# Patient Record
Sex: MALE | Race: WHITE
[De-identification: names, ages, dates, MRNs, and addresses within clinical notes are randomized per-mention and may not be internally consistent; named-entity substitution may affect disease eponyms.]

---

## 2019-06-14 ENCOUNTER — HOSPITAL ENCOUNTER (EMERGENCY)
Dept: HOSPITAL 95 - ER | Age: 79
Discharge: ANOTHER HOSPITAL | End: 2019-06-14
Payer: MEDICARE

## 2019-06-14 VITALS — WEIGHT: 178 LBS | BODY MASS INDEX: 24.11 KG/M2 | HEIGHT: 72 IN

## 2019-06-14 DIAGNOSIS — M81.0: ICD-10-CM

## 2019-06-14 DIAGNOSIS — K92.2: Primary | ICD-10-CM

## 2019-06-14 DIAGNOSIS — Z79.899: ICD-10-CM

## 2019-06-14 DIAGNOSIS — E11.22: ICD-10-CM

## 2019-06-14 DIAGNOSIS — I25.10: ICD-10-CM

## 2019-06-14 DIAGNOSIS — N18.9: ICD-10-CM

## 2019-06-14 DIAGNOSIS — E03.9: ICD-10-CM

## 2019-06-14 DIAGNOSIS — D50.0: ICD-10-CM

## 2019-06-14 LAB
ALBUMIN SERPL BCP-MCNC: 3.2 G/DL (ref 3.4–5)
ALBUMIN/GLOB SERPL: 1 {RATIO} (ref 0.8–1.8)
ALT SERPL W P-5'-P-CCNC: 9 U/L (ref 12–78)
ANION GAP SERPL CALCULATED.4IONS-SCNC: 7 MMOL/L (ref 6–16)
AST SERPL W P-5'-P-CCNC: 7 U/L (ref 12–37)
BASOPHILS # BLD AUTO: 0.01 K/MM3 (ref 0–0.23)
BASOPHILS NFR BLD AUTO: 0 % (ref 0–2)
BILIRUB SERPL-MCNC: 0.5 MG/DL (ref 0.1–1)
BUN SERPL-MCNC: 27 MG/DL (ref 8–24)
CALCIUM SERPL-MCNC: 8.5 MG/DL (ref 8.5–10.1)
CHLORIDE SERPL-SCNC: 109 MMOL/L (ref 98–108)
CO2 SERPL-SCNC: 27 MMOL/L (ref 21–32)
CREAT SERPL-MCNC: 1.14 MG/DL (ref 0.6–1.2)
DEPRECATED RDW RBC AUTO: 44.1 FL (ref 35.1–46.3)
EOSINOPHIL # BLD AUTO: 0.1 K/MM3 (ref 0–0.68)
EOSINOPHIL NFR BLD AUTO: 3 % (ref 0–6)
ERYTHROCYTE [DISTWIDTH] IN BLOOD BY AUTOMATED COUNT: 15.4 % (ref 11.7–14.2)
GLOBULIN SER CALC-MCNC: 3.3 G/DL (ref 2.2–4)
GLUCOSE SERPL-MCNC: 115 MG/DL (ref 70–99)
HCT VFR BLD AUTO: 33.2 % (ref 37–53)
HGB BLD-MCNC: 9.7 G/DL (ref 13.5–17.5)
IMM GRANULOCYTES # BLD AUTO: 0.01 K/MM3 (ref 0–0.1)
IMM GRANULOCYTES NFR BLD AUTO: 0 % (ref 0–1)
LYMPHOCYTES # BLD AUTO: 0.54 K/MM3 (ref 0.84–5.2)
LYMPHOCYTES NFR BLD AUTO: 14 % (ref 21–46)
MCHC RBC AUTO-ENTMCNC: 29.2 G/DL (ref 31.5–36.5)
MCV RBC AUTO: 79 FL (ref 80–100)
MONOCYTES # BLD AUTO: 0.33 K/MM3 (ref 0.16–1.47)
MONOCYTES NFR BLD AUTO: 8 % (ref 4–13)
NEUTROPHILS # BLD AUTO: 3.01 K/MM3 (ref 1.96–9.15)
NEUTROPHILS NFR BLD AUTO: 75 % (ref 41–73)
NRBC # BLD AUTO: 0 K/MM3 (ref 0–0.02)
NRBC BLD AUTO-RTO: 0 /100 WBC (ref 0–0.2)
PLATELET # BLD AUTO: 168 K/MM3 (ref 150–400)
POTASSIUM SERPL-SCNC: 4 MMOL/L (ref 3.5–5.5)
PROT SERPL-MCNC: 6.5 G/DL (ref 6.4–8.2)
PROTHROMBIN TIME: 12.3 SEC (ref 9.7–11.5)
SODIUM SERPL-SCNC: 143 MMOL/L (ref 136–145)

## 2019-11-25 ENCOUNTER — HOSPITAL ENCOUNTER (INPATIENT)
Dept: HOSPITAL 95 - ER | Age: 79
LOS: 4 days | Discharge: HOME HEALTH | DRG: 299 | End: 2019-11-29
Attending: FAMILY MEDICINE | Admitting: FAMILY MEDICINE
Payer: OTHER GOVERNMENT

## 2019-11-25 VITALS — HEIGHT: 72.01 IN | WEIGHT: 180.01 LBS | BODY MASS INDEX: 24.38 KG/M2

## 2019-11-25 DIAGNOSIS — I26.99: ICD-10-CM

## 2019-11-25 DIAGNOSIS — E11.22: ICD-10-CM

## 2019-11-25 DIAGNOSIS — K21.9: ICD-10-CM

## 2019-11-25 DIAGNOSIS — E78.5: ICD-10-CM

## 2019-11-25 DIAGNOSIS — I25.10: ICD-10-CM

## 2019-11-25 DIAGNOSIS — N40.0: ICD-10-CM

## 2019-11-25 DIAGNOSIS — Z66: ICD-10-CM

## 2019-11-25 DIAGNOSIS — I82.411: Primary | ICD-10-CM

## 2019-11-25 DIAGNOSIS — D63.8: ICD-10-CM

## 2019-11-25 DIAGNOSIS — M81.0: ICD-10-CM

## 2019-11-25 DIAGNOSIS — N18.9: ICD-10-CM

## 2019-11-25 DIAGNOSIS — E03.9: ICD-10-CM

## 2019-11-25 DIAGNOSIS — F03.91: ICD-10-CM

## 2019-11-25 DIAGNOSIS — I12.9: ICD-10-CM

## 2019-11-25 DIAGNOSIS — L03.115: ICD-10-CM

## 2019-11-25 DIAGNOSIS — G89.4: ICD-10-CM

## 2019-11-25 DIAGNOSIS — D72.819: ICD-10-CM

## 2019-11-25 DIAGNOSIS — I82.511: ICD-10-CM

## 2019-11-25 LAB
ALBUMIN SERPL BCP-MCNC: 3.2 G/DL (ref 3.4–5)
ALBUMIN/GLOB SERPL: 0.9 {RATIO} (ref 0.8–1.8)
ALT SERPL W P-5'-P-CCNC: 16 U/L (ref 12–78)
ANION GAP SERPL CALCULATED.4IONS-SCNC: 6 MMOL/L (ref 6–16)
AST SERPL W P-5'-P-CCNC: 10 U/L (ref 12–37)
BASOPHILS # BLD AUTO: 0.02 K/MM3 (ref 0–0.23)
BASOPHILS NFR BLD AUTO: 0 % (ref 0–2)
BILIRUB SERPL-MCNC: 0.3 MG/DL (ref 0.1–1)
BUN SERPL-MCNC: 22 MG/DL (ref 8–24)
CALCIUM SERPL-MCNC: 8.3 MG/DL (ref 8.5–10.1)
CHLORIDE SERPL-SCNC: 110 MMOL/L (ref 98–108)
CO2 SERPL-SCNC: 27 MMOL/L (ref 21–32)
CREAT SERPL-MCNC: 1.16 MG/DL (ref 0.6–1.2)
DEPRECATED RDW RBC AUTO: 49.4 FL (ref 35.1–46.3)
EOSINOPHIL # BLD AUTO: 0.1 K/MM3 (ref 0–0.68)
EOSINOPHIL NFR BLD AUTO: 2 % (ref 0–6)
ERYTHROCYTE [DISTWIDTH] IN BLOOD BY AUTOMATED COUNT: 17.4 % (ref 11.7–14.2)
GLOBULIN SER CALC-MCNC: 3.7 G/DL (ref 2.2–4)
GLUCOSE SERPL-MCNC: 142 MG/DL (ref 70–99)
HCT VFR BLD AUTO: 33.2 % (ref 37–53)
HGB BLD-MCNC: 9.7 G/DL (ref 13.5–17.5)
IMM GRANULOCYTES # BLD AUTO: 0.03 K/MM3 (ref 0–0.1)
IMM GRANULOCYTES NFR BLD AUTO: 1 % (ref 0–1)
LYMPHOCYTES # BLD AUTO: 0.36 K/MM3 (ref 0.84–5.2)
LYMPHOCYTES NFR BLD AUTO: 8 % (ref 21–46)
MCHC RBC AUTO-ENTMCNC: 29.2 G/DL (ref 31.5–36.5)
MCV RBC AUTO: 77 FL (ref 80–100)
MONOCYTES # BLD AUTO: 0.36 K/MM3 (ref 0.16–1.47)
MONOCYTES NFR BLD AUTO: 8 % (ref 4–13)
NEUTROPHILS # BLD AUTO: 3.62 K/MM3 (ref 1.96–9.15)
NEUTROPHILS NFR BLD AUTO: 81 % (ref 41–73)
NRBC # BLD AUTO: 0 K/MM3 (ref 0–0.02)
NRBC BLD AUTO-RTO: 0 /100 WBC (ref 0–0.2)
PLATELET # BLD AUTO: 141 K/MM3 (ref 150–400)
POTASSIUM SERPL-SCNC: 3.8 MMOL/L (ref 3.5–5.5)
PROT SERPL-MCNC: 6.9 G/DL (ref 6.4–8.2)
PROTHROMBIN TIME: 11.7 SEC (ref 9.7–11.5)
SODIUM SERPL-SCNC: 143 MMOL/L (ref 136–145)

## 2019-11-25 PROCEDURE — G0378 HOSPITAL OBSERVATION PER HR: HCPCS

## 2019-11-25 PROCEDURE — A9270 NON-COVERED ITEM OR SERVICE: HCPCS

## 2019-11-25 NOTE — NUR
PATIENT IS ALERT WITH MINOR CONFUSION. HE KNOWS HIS NAME, WIFE WHY HE IS AT
THE HOSPITAL, [facility]. HE HAS NOT COMPLAINED OF
FOOT PAIN SINCE HE WAS ADMITTED. HE HAS HEPARIN AND NS RUNNING. HE ATE DINNER
TONIGHT. PATIENT CLAIMS HE DOESNT WALK. REPORT GIVEN TO JOSE NORMAN.

## 2019-11-26 LAB
ALBUMIN SERPL BCP-MCNC: 2.8 G/DL (ref 3.4–5)
ALBUMIN/GLOB SERPL: 0.8 {RATIO} (ref 0.8–1.8)
ALT SERPL W P-5'-P-CCNC: 8 U/L (ref 12–78)
ANION GAP SERPL CALCULATED.4IONS-SCNC: 6 MMOL/L (ref 6–16)
AST SERPL W P-5'-P-CCNC: 9 U/L (ref 12–37)
BASOPHILS # BLD AUTO: 0.01 K/MM3 (ref 0–0.23)
BASOPHILS NFR BLD AUTO: 0 % (ref 0–2)
BILIRUB SERPL-MCNC: 0.4 MG/DL (ref 0.1–1)
BUN SERPL-MCNC: 19 MG/DL (ref 8–24)
CALCIUM SERPL-MCNC: 7.9 MG/DL (ref 8.5–10.1)
CHLORIDE SERPL-SCNC: 108 MMOL/L (ref 98–108)
CO2 SERPL-SCNC: 28 MMOL/L (ref 21–32)
CREAT SERPL-MCNC: 1.1 MG/DL (ref 0.6–1.2)
DEPRECATED RDW RBC AUTO: 48.9 FL (ref 35.1–46.3)
EOSINOPHIL # BLD AUTO: 0.17 K/MM3 (ref 0–0.68)
EOSINOPHIL NFR BLD AUTO: 4 % (ref 0–6)
ERYTHROCYTE [DISTWIDTH] IN BLOOD BY AUTOMATED COUNT: 17.5 % (ref 11.7–14.2)
GLOBULIN SER CALC-MCNC: 3.4 G/DL (ref 2.2–4)
GLUCOSE SERPL-MCNC: 122 MG/DL (ref 70–99)
HCT VFR BLD AUTO: 30.7 % (ref 37–53)
HGB BLD-MCNC: 8.8 G/DL (ref 13.5–17.5)
IMM GRANULOCYTES # BLD AUTO: 0.02 K/MM3 (ref 0–0.1)
IMM GRANULOCYTES NFR BLD AUTO: 1 % (ref 0–1)
LYMPHOCYTES # BLD AUTO: 0.56 K/MM3 (ref 0.84–5.2)
LYMPHOCYTES NFR BLD AUTO: 13 % (ref 21–46)
MAGNESIUM SERPL-MCNC: 1.9 MG/DL (ref 1.6–2.4)
MCHC RBC AUTO-ENTMCNC: 28.7 G/DL (ref 31.5–36.5)
MCV RBC AUTO: 77 FL (ref 80–100)
MONOCYTES # BLD AUTO: 0.46 K/MM3 (ref 0.16–1.47)
MONOCYTES NFR BLD AUTO: 11 % (ref 4–13)
NEUTROPHILS # BLD AUTO: 3.05 K/MM3 (ref 1.96–9.15)
NEUTROPHILS NFR BLD AUTO: 71 % (ref 41–73)
NRBC # BLD AUTO: 0 K/MM3 (ref 0–0.02)
NRBC BLD AUTO-RTO: 0 /100 WBC (ref 0–0.2)
PLATELET # BLD AUTO: 116 K/MM3 (ref 150–400)
POTASSIUM SERPL-SCNC: 4 MMOL/L (ref 3.5–5.5)
PROT SERPL-MCNC: 6.2 G/DL (ref 6.4–8.2)
SODIUM SERPL-SCNC: 142 MMOL/L (ref 136–145)

## 2019-11-26 NOTE — NUR
Spiritual Care initial note:
 
Asked to visit Mr. Calix as it was reported he was tearful earlier today.
When I entered room, he appeared aggitated.  He could not work the in-room
phone.  He began to complain that he hadn't eaten "since yesterday" in prep
for proceedure today.  He was angry he'd been kept waiting.  I provided
emotional affirmation and calm assurance of care.  He calmed slightly.  He
asked me to help him call his wife.  I did and ended visit at that point.  I
will remain available.

## 2019-11-26 NOTE — NUR
11/26/19   0600   NPO FOR POSS. PROCEDURE TODAY. VITALS STABLE. SLEPT WELL
THIS SHIFT. POOR SHORT-TERM MEMORY. NEEDS FREQUENT REMINDERS OF INFO. GIVEN.
REPOSITIONED Q 2 HOURS WITH HELP.

## 2019-11-26 NOTE — NUR
SHIFT SUMMARY
 
PATIENT IN BED ALL SHIFT NPO AWAITING VISIT FROM DOCTOR. UNABLE TO PERFORM ANY
THROMBECTOMY TODAY. RLE PAIN REPORTED AS TOLERABLE BY PATIENT. IV ABX INFUSED
NO ASE INDICATED. HEPARIN INFUSING AND VERIFIED WITH TWO NURSES.
AWAITING DOCTOR PRASANNA
INSTRUCTIONS AT THIS TIME.

## 2019-11-27 LAB
ANION GAP SERPL CALCULATED.4IONS-SCNC: 6 MMOL/L (ref 6–16)
BASOPHILS # BLD AUTO: 0.02 K/MM3 (ref 0–0.23)
BASOPHILS NFR BLD AUTO: 1 % (ref 0–2)
BUN SERPL-MCNC: 15 MG/DL (ref 8–24)
CALCIUM SERPL-MCNC: 8.2 MG/DL (ref 8.5–10.1)
CHLORIDE SERPL-SCNC: 109 MMOL/L (ref 98–108)
CO2 SERPL-SCNC: 27 MMOL/L (ref 21–32)
CREAT SERPL-MCNC: 1.02 MG/DL (ref 0.6–1.2)
DEPRECATED RDW RBC AUTO: 45.6 FL (ref 35.1–46.3)
EOSINOPHIL # BLD AUTO: 0.17 K/MM3 (ref 0–0.68)
EOSINOPHIL NFR BLD AUTO: 6 % (ref 0–6)
ERYTHROCYTE [DISTWIDTH] IN BLOOD BY AUTOMATED COUNT: 17 % (ref 11.7–14.2)
GLUCOSE SERPL-MCNC: 126 MG/DL (ref 70–99)
HCT VFR BLD AUTO: 32.7 % (ref 37–53)
HGB BLD-MCNC: 9.6 G/DL (ref 13.5–17.5)
IMM GRANULOCYTES # BLD AUTO: 0 K/MM3 (ref 0–0.1)
IMM GRANULOCYTES NFR BLD AUTO: 0 % (ref 0–1)
LYMPHOCYTES # BLD AUTO: 0.3 K/MM3 (ref 0.84–5.2)
LYMPHOCYTES NFR BLD AUTO: 10 % (ref 21–46)
MCHC RBC AUTO-ENTMCNC: 29.4 G/DL (ref 31.5–36.5)
MCV RBC AUTO: 75 FL (ref 80–100)
MONOCYTES # BLD AUTO: 0.21 K/MM3 (ref 0.16–1.47)
MONOCYTES NFR BLD AUTO: 7 % (ref 4–13)
NEUTROPHILS # BLD AUTO: 2.38 K/MM3 (ref 1.96–9.15)
NEUTROPHILS NFR BLD AUTO: 77 % (ref 41–73)
NRBC # BLD AUTO: 0 K/MM3 (ref 0–0.02)
NRBC BLD AUTO-RTO: 0 /100 WBC (ref 0–0.2)
PLATELET # BLD AUTO: 143 K/MM3 (ref 150–400)
POTASSIUM SERPL-SCNC: 4.2 MMOL/L (ref 3.5–5.5)
SODIUM SERPL-SCNC: 142 MMOL/L (ref 136–145)

## 2019-11-27 NOTE — NUR
11/27/19  0600  HEPARIN DRIP RUNNING AND RATE UNCHANGED PER PHARMACY THIS
SHIFT. PT FORGETFUL AND KEEPS BENDING ARMS WITH IV. HE ALSO REMOVES WRAPS
APPLIED BY RN TO PREVENT IVS "ALARMING". TURNED Q 2 HOURS WITH 2 STAFF.

## 2019-11-27 NOTE — NUR
SHIFT SUMMARY
 
PATIENT IN BED ENTIRE SHIFT. MULTIPLE BOWEL MOVEMENTS THIS SHIFT, SOFT. DR. MIMS VISITED WITH PATIENT TODAY AND DISCUSSED OPTIONS, AWAITING DEFINITIVE
PLAN FOR PATIENT FROM DR. MIMS. PATIENT APPETITE IS GOOD. PAIN LEVEL
REPORTED AS TOLERABLE. ABLE TO PALPATE PEDAL PULSE OF RIGHT LEG TODAY AND
SWELLING STILL PRESENT. DRESSINGS ON BILAT LEGS CHANGED AND THEY ARE CDI.
FLUID RESTRICTION OBSERVED BY PATIENT. TELEMETRY D/C TODAY. PATIENT HAD A
CONSULT WITH PT TODAY AND THEY WORKED WITH PATIENT IN ROOM, TOLERATED WELL.

## 2019-11-27 NOTE — NUR
SHIFT SUMMARY
 
PATIENT IN BED ENTIRETY OF SHIFT BUT TURNED Q 2 HOURS. MULTIPLE JARED CARE FOR
PATIENT D/T INCONTINENCE. PATIENT REMAINED ON HEPARIN DRIP ALL SHIFT + FLUIDS.
PATIENT VISITED BY DR. MIMS TODAY ABOUT THE PLAN FOR THE CLOT IN LEG,
AWAITING A PLAN BY DR. MIMS. ALL PO MEDICATIONS ADMINISTERED AND TOLERATED
WELL.

## 2019-11-28 LAB
ANION GAP SERPL CALCULATED.4IONS-SCNC: 6 MMOL/L (ref 6–16)
BASOPHILS # BLD AUTO: 0.03 K/MM3 (ref 0–0.23)
BASOPHILS NFR BLD AUTO: 1 % (ref 0–2)
BUN SERPL-MCNC: 16 MG/DL (ref 8–24)
CALCIUM SERPL-MCNC: 8.5 MG/DL (ref 8.5–10.1)
CHLORIDE SERPL-SCNC: 109 MMOL/L (ref 98–108)
CO2 SERPL-SCNC: 27 MMOL/L (ref 21–32)
CREAT SERPL-MCNC: 1.04 MG/DL (ref 0.6–1.2)
DEPRECATED RDW RBC AUTO: 46.5 FL (ref 35.1–46.3)
EOSINOPHIL # BLD AUTO: 0.15 K/MM3 (ref 0–0.68)
EOSINOPHIL NFR BLD AUTO: 4 % (ref 0–6)
ERYTHROCYTE [DISTWIDTH] IN BLOOD BY AUTOMATED COUNT: 17.2 % (ref 11.7–14.2)
GLUCOSE SERPL-MCNC: 117 MG/DL (ref 70–99)
HCT VFR BLD AUTO: 32.6 % (ref 37–53)
HGB BLD-MCNC: 9.4 G/DL (ref 13.5–17.5)
IMM GRANULOCYTES # BLD AUTO: 0.02 K/MM3 (ref 0–0.1)
IMM GRANULOCYTES NFR BLD AUTO: 1 % (ref 0–1)
LYMPHOCYTES # BLD AUTO: 0.41 K/MM3 (ref 0.84–5.2)
LYMPHOCYTES NFR BLD AUTO: 12 % (ref 21–46)
MCHC RBC AUTO-ENTMCNC: 28.8 G/DL (ref 31.5–36.5)
MCV RBC AUTO: 76 FL (ref 80–100)
MONOCYTES # BLD AUTO: 0.26 K/MM3 (ref 0.16–1.47)
MONOCYTES NFR BLD AUTO: 8 % (ref 4–13)
NEUTROPHILS # BLD AUTO: 2.6 K/MM3 (ref 1.96–9.15)
NEUTROPHILS NFR BLD AUTO: 75 % (ref 41–73)
NRBC # BLD AUTO: 0 K/MM3 (ref 0–0.02)
NRBC BLD AUTO-RTO: 0 /100 WBC (ref 0–0.2)
PLATELET # BLD AUTO: 139 K/MM3 (ref 150–400)
POTASSIUM SERPL-SCNC: 4 MMOL/L (ref 3.5–5.5)
SODIUM SERPL-SCNC: 142 MMOL/L (ref 136–145)

## 2019-11-28 NOTE — NUR
SHIFT SUMMARY
 
NO ACUTE CHANGES THIS SHIFT. PT HAS BEEN SLEEPING A LOT OF THE SHIFT. HEPARIN
INFUSION STOPPED THIS AFTERNOON AND XARELTO WAS STARTED. PLANS FOR PT TO
DISCHARGE TOMORROW AND RETURN TO [facility]. NO COMPLAINTS AT THIS TIME.
CALL LIGHT IN REACH. WILL CONTINUE TO MONITOR AND REPORT TO ONCOMING RN.

## 2019-11-28 NOTE — NUR
SHIFT SUMMARY
PT PLEASANT WITH FLAT AFFECT. A/O. ANSWERS QUESTIONS APPROPRIATELY. PT
REPORTED PAIN TO RLE BUT DECLINED TYLENOL. MINIMAL REDNESS AND SWELLING TO
RLE. HEPARIN DRIP CONTINUES. NO RATE CHANGES THIS EVENING, CONTINUES TO RUN AT
16 U/KG/HR. PT INCONTINENT. ATTENDS IN PLACE. PT TURNED Q 2 HRS. PT HAD ONE
SMALL SOFT BOWEL MOVEMENT THIS EVENING. REDNESS TO JARED/RECTAL AREA, BARRIER
CREAM APPLIED. OTHERWISE PT HAD UNEVENTFUL NIGHT. SLEPT WELL WHEN NOT BEING
WOKEN BY STAFF. VITAL SIGNS STABLE.

## 2019-11-29 NOTE — NUR
SHIFT SUMMARY
PT PLEASANT AND COOPERATIVE. FLAT AFFECT. REMAINED INCONTINENT. REPOSITIONED
IN BED FREQUENTLY AND ATTENDS CHANGED AS NEEDED. ORDERED XARELTO DOSE GIVEN
THIS EVENING. MINIMAL SWELLING TO RLE. PT CONTINUES TO REPORT PAIN TO RLE.
OFFERED TYLENOL BUT PT DECLINED. PT SLEPT MOST OF THE NIGHT WHEN NOT BEING
WOKEN BY STAFF. VITAL SIGNS STABLE. NO ACUTE CHANGES THIS SHIFT. WILL CONTINUE
TO MONITOR.

## 2019-12-04 ENCOUNTER — HOSPITAL ENCOUNTER (EMERGENCY)
Dept: HOSPITAL 95 - ER | Age: 79
Discharge: HOME | End: 2019-12-04
Payer: OTHER GOVERNMENT

## 2019-12-04 VITALS — WEIGHT: 164.99 LBS | HEIGHT: 72 IN | BODY MASS INDEX: 22.35 KG/M2

## 2019-12-04 DIAGNOSIS — E11.22: ICD-10-CM

## 2019-12-04 DIAGNOSIS — I12.9: ICD-10-CM

## 2019-12-04 DIAGNOSIS — N18.9: ICD-10-CM

## 2019-12-04 DIAGNOSIS — E03.9: ICD-10-CM

## 2019-12-04 DIAGNOSIS — K21.9: ICD-10-CM

## 2019-12-04 DIAGNOSIS — R41.0: ICD-10-CM

## 2019-12-04 DIAGNOSIS — Z79.899: ICD-10-CM

## 2019-12-04 DIAGNOSIS — F03.90: Primary | ICD-10-CM

## 2019-12-04 LAB
ANION GAP SERPL CALCULATED.4IONS-SCNC: 7 MMOL/L (ref 6–16)
BASOPHILS # BLD AUTO: 0.01 K/MM3 (ref 0–0.23)
BASOPHILS NFR BLD AUTO: 0 % (ref 0–2)
BUN SERPL-MCNC: 30 MG/DL (ref 8–24)
CALCIUM SERPL-MCNC: 8.6 MG/DL (ref 8.5–10.1)
CHLORIDE SERPL-SCNC: 111 MMOL/L (ref 98–108)
CO2 SERPL-SCNC: 25 MMOL/L (ref 21–32)
CREAT SERPL-MCNC: 1.14 MG/DL (ref 0.6–1.2)
DEPRECATED RDW RBC AUTO: 47.8 FL (ref 35.1–46.3)
EOSINOPHIL # BLD AUTO: 0.01 K/MM3 (ref 0–0.68)
EOSINOPHIL NFR BLD AUTO: 0 % (ref 0–6)
ERYTHROCYTE [DISTWIDTH] IN BLOOD BY AUTOMATED COUNT: 18 % (ref 11.7–14.2)
GLUCOSE SERPL-MCNC: 117 MG/DL (ref 70–99)
HCT VFR BLD AUTO: 34.1 % (ref 37–53)
HGB BLD-MCNC: 10 G/DL (ref 13.5–17.5)
IMM GRANULOCYTES # BLD AUTO: 0.02 K/MM3 (ref 0–0.1)
IMM GRANULOCYTES NFR BLD AUTO: 0 % (ref 0–1)
LYMPHOCYTES # BLD AUTO: 0.25 K/MM3 (ref 0.84–5.2)
LYMPHOCYTES NFR BLD AUTO: 3 % (ref 21–46)
MCHC RBC AUTO-ENTMCNC: 29.3 G/DL (ref 31.5–36.5)
MCV RBC AUTO: 75 FL (ref 80–100)
MONOCYTES # BLD AUTO: 0.49 K/MM3 (ref 0.16–1.47)
MONOCYTES NFR BLD AUTO: 6 % (ref 4–13)
NEUTROPHILS # BLD AUTO: 7.46 K/MM3 (ref 1.96–9.15)
NEUTROPHILS NFR BLD AUTO: 91 % (ref 41–73)
NRBC # BLD AUTO: 0 K/MM3 (ref 0–0.02)
NRBC BLD AUTO-RTO: 0 /100 WBC (ref 0–0.2)
PLATELET # BLD AUTO: 146 K/MM3 (ref 150–400)
POTASSIUM SERPL-SCNC: 3.9 MMOL/L (ref 3.5–5.5)
PROT UR STRIP-MCNC: (no result) MG/DL
SODIUM SERPL-SCNC: 143 MMOL/L (ref 136–145)
SP GR SPEC: 1.02 (ref 1–1.02)
UROBILINOGEN UR STRIP-MCNC: (no result) MG/DL
WBC #/AREA URNS HPF: (no result) /HPF (ref 0–5)

## 2019-12-10 ENCOUNTER — HOSPITAL ENCOUNTER (EMERGENCY)
Dept: HOSPITAL 95 - ER | Age: 79
Discharge: HOME | End: 2019-12-10
Payer: OTHER GOVERNMENT

## 2019-12-10 VITALS — HEIGHT: 72 IN | WEIGHT: 200 LBS | BODY MASS INDEX: 27.09 KG/M2

## 2019-12-10 DIAGNOSIS — E03.9: ICD-10-CM

## 2019-12-10 DIAGNOSIS — I82.501: ICD-10-CM

## 2019-12-10 DIAGNOSIS — I12.9: ICD-10-CM

## 2019-12-10 DIAGNOSIS — E11.22: ICD-10-CM

## 2019-12-10 DIAGNOSIS — I26.99: Primary | ICD-10-CM

## 2019-12-10 DIAGNOSIS — K21.9: ICD-10-CM

## 2019-12-10 DIAGNOSIS — N18.9: ICD-10-CM

## 2019-12-10 DIAGNOSIS — Z79.899: ICD-10-CM

## 2019-12-10 LAB
ALBUMIN SERPL BCP-MCNC: 3.2 G/DL (ref 3.4–5)
ALBUMIN/GLOB SERPL: 0.8 {RATIO} (ref 0.8–1.8)
ALT SERPL W P-5'-P-CCNC: 21 U/L (ref 12–78)
ANION GAP SERPL CALCULATED.4IONS-SCNC: 5 MMOL/L (ref 6–16)
AST SERPL W P-5'-P-CCNC: 24 U/L (ref 12–37)
BASOPHILS # BLD AUTO: 0.02 K/MM3 (ref 0–0.23)
BASOPHILS NFR BLD AUTO: 0 % (ref 0–2)
BILIRUB SERPL-MCNC: 0.3 MG/DL (ref 0.1–1)
BUN SERPL-MCNC: 27 MG/DL (ref 8–24)
CALCIUM SERPL-MCNC: 8.5 MG/DL (ref 8.5–10.1)
CHLORIDE SERPL-SCNC: 113 MMOL/L (ref 98–108)
CO2 SERPL-SCNC: 27 MMOL/L (ref 21–32)
CREAT SERPL-MCNC: 0.94 MG/DL (ref 0.6–1.2)
DEPRECATED RDW RBC AUTO: 49.8 FL (ref 35.1–46.3)
EOSINOPHIL # BLD AUTO: 0.14 K/MM3 (ref 0–0.68)
EOSINOPHIL NFR BLD AUTO: 3 % (ref 0–6)
ERYTHROCYTE [DISTWIDTH] IN BLOOD BY AUTOMATED COUNT: 17.6 % (ref 11.7–14.2)
GLOBULIN SER CALC-MCNC: 3.9 G/DL (ref 2.2–4)
GLUCOSE SERPL-MCNC: 109 MG/DL (ref 70–99)
HCT VFR BLD AUTO: 34.2 % (ref 37–53)
HGB BLD-MCNC: 9.6 G/DL (ref 13.5–17.5)
IMM GRANULOCYTES # BLD AUTO: 0.02 K/MM3 (ref 0–0.1)
IMM GRANULOCYTES NFR BLD AUTO: 0 % (ref 0–1)
LYMPHOCYTES # BLD AUTO: 0.5 K/MM3 (ref 0.84–5.2)
LYMPHOCYTES NFR BLD AUTO: 11 % (ref 21–46)
MCHC RBC AUTO-ENTMCNC: 28.1 G/DL (ref 31.5–36.5)
MCV RBC AUTO: 78 FL (ref 80–100)
MONOCYTES # BLD AUTO: 0.3 K/MM3 (ref 0.16–1.47)
MONOCYTES NFR BLD AUTO: 6 % (ref 4–13)
NEUTROPHILS # BLD AUTO: 3.75 K/MM3 (ref 1.96–9.15)
NEUTROPHILS NFR BLD AUTO: 79 % (ref 41–73)
NRBC # BLD AUTO: 0 K/MM3 (ref 0–0.02)
NRBC BLD AUTO-RTO: 0 /100 WBC (ref 0–0.2)
PLATELET # BLD AUTO: 185 K/MM3 (ref 150–400)
POTASSIUM SERPL-SCNC: 4.2 MMOL/L (ref 3.5–5.5)
PROT SERPL-MCNC: 7.1 G/DL (ref 6.4–8.2)
SODIUM SERPL-SCNC: 145 MMOL/L (ref 136–145)
TROPONIN I SERPL-MCNC: <0.015 NG/ML (ref 0–0.04)

## 2023-06-23 NOTE — NUR
DISCHARGE INSTRUCTIONS VERBALIZED TO PATIENT AS WELL AS A PRINTED COPY HANDED
TO PATIENT FOR REFERENCE. ALL QUESTIONS ANSWERED. IV'S REMOVED FROM BOTH AC
SITES. PATIENT  IS WAITING FOR TRANSPORT TO [facility] AT THIS TIME.
TRANSPORT TO OCCUR AROUND 11OO. The Troponin test (to rule out heart attack) and the D-dimer test (to look for evidence of a recent blood clot) are pending.  I will call you with these lab results.  If any of these lab tests are elevated, go to the [facility] emergency room for further evaluation.      If these tests are both normal, take Ibuprofen for the chest pain.      Avoid strenuous activities until the chest pain improves.     Follow up with a cardiologist if not better in one week.      Go to the emergency room if you develop worsening chest pain, shortness of breath, leg pain, worsening cough/leg pain, fevers.